# Patient Record
Sex: MALE | URBAN - METROPOLITAN AREA
[De-identification: names, ages, dates, MRNs, and addresses within clinical notes are randomized per-mention and may not be internally consistent; named-entity substitution may affect disease eponyms.]

---

## 2019-11-15 ENCOUNTER — TELEPHONE (OUTPATIENT)
Dept: FAMILY MEDICINE CLINIC | Facility: CLINIC | Age: 44
End: 2019-11-15

## 2019-11-15 NOTE — TELEPHONE ENCOUNTER
Please advise. Do you want to see him on Tuesday to establish care and then again later next week for his pre-op visit?     Future Appointments   Date Time Provider Zane Barrow   11/19/2019 10:45 AM DO TETE Miller

## 2019-11-15 NOTE — TELEPHONE ENCOUNTER
Pt is New Pt,  Needs a Pre-Op PX for Rotator Cuff Surgery,  Pt is scheduled @ [facility].  Pt is scheduled to get established on 11/19 @ 10:45

## 2019-11-15 NOTE — TELEPHONE ENCOUNTER
Call tell we can just do his preop visit on the first time when I see him Tuesday. Do we have any correspondence from the surgeon? Please try to some of that.

## 2019-11-18 NOTE — TELEPHONE ENCOUNTER
Pt cancelled OV Appt 11/19/19,  Pt will be pushing his Surgery back, has to go [place] of [place] for job.  Will call to reschedule once he knows new surgery date